# Patient Record
Sex: FEMALE | Race: WHITE | NOT HISPANIC OR LATINO | Employment: FULL TIME | URBAN - METROPOLITAN AREA
[De-identification: names, ages, dates, MRNs, and addresses within clinical notes are randomized per-mention and may not be internally consistent; named-entity substitution may affect disease eponyms.]

---

## 2021-08-17 ENCOUNTER — TRANSFERRED RECORDS (OUTPATIENT)
Dept: HEALTH INFORMATION MANAGEMENT | Facility: CLINIC | Age: 36
End: 2021-08-17

## 2021-08-18 ENCOUNTER — TRANSFERRED RECORDS (OUTPATIENT)
Dept: HEALTH INFORMATION MANAGEMENT | Facility: CLINIC | Age: 36
End: 2021-08-18

## 2021-08-26 ENCOUNTER — TRANSFERRED RECORDS (OUTPATIENT)
Dept: HEALTH INFORMATION MANAGEMENT | Facility: CLINIC | Age: 36
End: 2021-08-26

## 2021-09-02 ENCOUNTER — TRANSFERRED RECORDS (OUTPATIENT)
Dept: HEALTH INFORMATION MANAGEMENT | Facility: CLINIC | Age: 36
End: 2021-09-02

## 2021-09-03 ENCOUNTER — TELEPHONE (OUTPATIENT)
Dept: OBGYN | Facility: CLINIC | Age: 36
End: 2021-09-03

## 2021-09-03 ENCOUNTER — MEDICAL CORRESPONDENCE (OUTPATIENT)
Dept: HEALTH INFORMATION MANAGEMENT | Facility: CLINIC | Age: 36
End: 2021-09-03

## 2021-09-03 ENCOUNTER — PREP FOR PROCEDURE (OUTPATIENT)
Dept: OBGYN | Facility: CLINIC | Age: 36
End: 2021-09-03

## 2021-09-03 DIAGNOSIS — Z20.822 COVID-19 RULED OUT: ICD-10-CM

## 2021-09-03 DIAGNOSIS — Z33.2 TERMINATION OF PREGNANCY (FETUS): Primary | ICD-10-CM

## 2021-09-03 DIAGNOSIS — O35.13X1 FETAL TRISOMY 21 AFFECTING CARE OF MOTHER, ANTEPARTUM, FETUS 1: Primary | ICD-10-CM

## 2021-09-03 RX ORDER — MISOPROSTOL 200 UG/1
TABLET ORAL
Qty: 2 TABLET | Refills: 0 | Status: ON HOLD | OUTPATIENT
Start: 2021-09-03 | End: 2021-09-08

## 2021-09-03 NOTE — PROGRESS NOTES
Kimberly Minor is a 36 year old  at 13w5d today    VALE: 3/6/22  Fetal Diagnosis: trisomy 21 (+NIPT, CVS)  Ob History: 2 (presumed) , 3 early pregnancy losses  Referral provider: Irma Talyor MD ([facility])    Surgery planning:  -- WRTK: Kiel Frankel MD ([facility]) on 9/3/21  -- Miso: 400 mcg buccally 2-3 hours prior to procedure  -- Osmotic dilators: no  -- Cremation: Unknown  -- Footprints: Unknown  -- Cytogenetics: Unknown    Pre-op orders placed.  MD Samantha

## 2021-09-03 NOTE — TELEPHONE ENCOUNTER
Day of Surgery:    Your surgery is scheduled at [facility].  [facility].  [address] [place] 3rd floor.  If parking is needed please park in the [place].  If you are unsure how to get to the hospital - search google maps for [facility] [address].    Just stop at the  and security will tell you where you need to go for your surgery.    Arrive at the hospital 2 hours before your surgery at 8am.  Your surgery is scheduled at 10am.  Please do not eat or drink after midnight.  You may have sips of clear liquids (water, black coffee, Gatorade) up to 8am.  If you have been prescribed medication to take before surgery or if you have prescription medications that you take everyday, you can take these with a sip of water.    A responsible adult will need to take you home after surgery.     Someone will be calling you to schedule a Covid PCR test.

## 2021-09-03 NOTE — TELEPHONE ENCOUNTER
This writer called pt and discussed pre op information for the Scheduled D&E.  Cytotec ordered to pt preferred pharmacy.

## 2021-09-03 NOTE — TELEPHONE ENCOUNTER
Referral received from [facility] for D&E.  She is being referred to [facility] because Trisomy 21  Gestational age 13+5  VALE 03/06/22  Medical records have/have not been received    An email has been sent to Kamaljit/financial counselor to verify insurance  WTRK consent completed:09/3/21  If MA, the Medical Necessity form is complete and has been priority scanned to the patient's chart.

## 2021-09-07 ENCOUNTER — ANESTHESIA EVENT (OUTPATIENT)
Dept: SURGERY | Facility: CLINIC | Age: 36
End: 2021-09-07
Payer: COMMERCIAL

## 2021-09-07 ENCOUNTER — TELEPHONE (OUTPATIENT)
Dept: OBGYN | Facility: CLINIC | Age: 36
End: 2021-09-07

## 2021-09-07 ENCOUNTER — LAB (OUTPATIENT)
Dept: LAB | Facility: CLINIC | Age: 36
End: 2021-09-07
Attending: OBSTETRICS & GYNECOLOGY
Payer: COMMERCIAL

## 2021-09-07 DIAGNOSIS — Z20.822 COVID-19 RULED OUT: ICD-10-CM

## 2021-09-07 PROBLEM — O35.13X1: Status: ACTIVE | Noted: 2021-09-07

## 2021-09-07 LAB — SARS-COV-2 RNA RESP QL NAA+PROBE: NEGATIVE

## 2021-09-07 PROCEDURE — U0003 INFECTIOUS AGENT DETECTION BY NUCLEIC ACID (DNA OR RNA); SEVERE ACUTE RESPIRATORY SYNDROME CORONAVIRUS 2 (SARS-COV-2) (CORONAVIRUS DISEASE [COVID-19]), AMPLIFIED PROBE TECHNIQUE, MAKING USE OF HIGH THROUGHPUT TECHNOLOGIES AS DESCRIBED BY CMS-2020-01-R: HCPCS

## 2021-09-07 PROCEDURE — U0005 INFEC AGEN DETEC AMPLI PROBE: HCPCS

## 2021-09-07 NOTE — ANESTHESIA PREPROCEDURE EVALUATION
Anesthesia Pre-Procedure Evaluation    Patient: Kimberly Minor   MRN: [id number] : 1985        Preoperative Diagnosis: Fetal trisomy 21 affecting care of mother, antepartum, fetus 1 [O35.1XX1]   Procedure : Procedure(s):  DILATION AND EVACUATION, UTERUS     No past medical history on file.   No past surgical history on file.   Not on File   Social History     Tobacco Use     Smoking status: Not on file   Substance Use Topics     Alcohol use: Not on file      Wt Readings from Last 1 Encounters:   No data found for Wt        Anesthesia Evaluation   Pt has had prior anesthetic. Type: General and MAC.        ROS/MED HX  ENT/Pulmonary:  - neg pulmonary ROS     Neurologic:  - neg neurologic ROS     Cardiovascular:     (+) -----Previous cardiac testing   Echo: Date:  Results:  Wnl  Stress Test: Date: Results:    ECG Reviewed: Date:  Results:  Wnl  Cath: Date: Results:      METS/Exercise Tolerance: >4 METS    Hematologic:  - neg hematologic  ROS     Musculoskeletal:  - neg musculoskeletal ROS     GI/Hepatic:  - neg GI/hepatic ROS     Renal/Genitourinary:  - neg Renal ROS     Endo:  - neg endo ROS     Psychiatric/Substance Use:     (+) psychiatric history anxiety     Infectious Disease:  - neg infectious disease ROS     Malignancy:  - neg malignancy ROS     Other: Comment:   at 13w5d today           Physical Exam    Airway  airway exam normal      Mallampati: I   TM distance: > 3 FB   Neck ROM: full   Mouth opening: > 3 cm    Respiratory Devices and Support         Dental  no notable dental history         Cardiovascular   cardiovascular exam normal          Pulmonary   pulmonary exam normal                OUTSIDE LABS:  CBC:   Lab Results   Component Value Date    WBC 6.3 2011    HGB 13.6 2011    HCT 40.2 2011     2011     BMP:   Lab Results   Component Value Date     2011    POTASSIUM 3.7 2011    CHLORIDE 100 2011    CO2 29 2011    BUN  14 08/01/2011    CR 0.87 08/01/2011    GLC 88 08/01/2011     COAGS: No results found for: PTT, INR, FIBR  POC:   Lab Results   Component Value Date    HCG Negative 08/01/2011     HEPATIC:   Lab Results   Component Value Date    ALBUMIN 4.7 08/01/2011    PROTTOTAL 8.4 08/01/2011    ALT 11 08/01/2011    AST 25 08/01/2011    ALKPHOS 60 08/01/2011    BILITOTAL 0.3 08/01/2011     OTHER:   Lab Results   Component Value Date    PRECIOUS 9.7 08/01/2011    TSH 1.27 08/01/2011       Anesthesia Plan    ASA Status:  2      Anesthesia Type: General.     - Airway: LMA   Induction: Intravenous.   Maintenance: TIVA.        Consents    Anesthesia Plan(s) and associated risks, benefits, and realistic alternatives discussed. Questions answered and patient/representative(s) expressed understanding.     - Discussed with:  Patient         Postoperative Care    Pain management: IV analgesics, Multi-modal analgesia.   PONV prophylaxis: Ondansetron (or other 5HT-3), Background Propofol Infusion, Dexamethasone or Solumedrol     Comments:    Pt wants to be asleep for the procedure. She is NPO appropriate, denies GERD, n/v.             Maryam Rocha MD

## 2021-09-07 NOTE — TELEPHONE ENCOUNTER
Confirmed surgery date, time and location, 9/8/21, arrival time at 8:00a.m with nothing to eat eight hours before scheduled surgery time, clear liquids up to two hours before, h&p will be done day of procedure, COVID testing 9/7/21.     to complete the following fields:            CHECKLIST     Google Calendar : Yes     Resident notified: Not Applicable     Clinic schedule blocked:  Not Applicable    Patient notified:Yes      Pre op information sent: Yes     Given to patient over the phone.Yes    Comments:

## 2021-09-08 ENCOUNTER — HOSPITAL ENCOUNTER (OUTPATIENT)
Facility: CLINIC | Age: 36
Discharge: HOME | End: 2021-09-08
Attending: OBSTETRICS & GYNECOLOGY | Admitting: OBSTETRICS & GYNECOLOGY
Payer: COMMERCIAL

## 2021-09-08 ENCOUNTER — ANESTHESIA (OUTPATIENT)
Dept: SURGERY | Facility: CLINIC | Age: 36
End: 2021-09-08
Payer: COMMERCIAL

## 2021-09-08 VITALS
HEIGHT: 66 IN | OXYGEN SATURATION: 100 % | RESPIRATION RATE: 18 BRPM | SYSTOLIC BLOOD PRESSURE: 105 MMHG | BODY MASS INDEX: 21.79 KG/M2 | HEART RATE: 67 BPM | TEMPERATURE: 98 F | DIASTOLIC BLOOD PRESSURE: 69 MMHG | WEIGHT: 135.58 LBS

## 2021-09-08 DIAGNOSIS — Z98.890 S/P DILATION AND CURETTAGE: Primary | ICD-10-CM

## 2021-09-08 DIAGNOSIS — O35.13X1 FETAL TRISOMY 21 AFFECTING CARE OF MOTHER, ANTEPARTUM, FETUS 1: ICD-10-CM

## 2021-09-08 LAB
ABO/RH(D): NORMAL
ANTIBODY SCREEN: NEGATIVE
GLUCOSE BLDC GLUCOMTR-MCNC: 77 MG/DL (ref 70–99)
HGB BLD-MCNC: 12.6 G/DL (ref 11.7–15.7)
HOLD SPECIMEN: NORMAL
SPECIMEN EXPIRATION DATE: NORMAL

## 2021-09-08 PROCEDURE — 88305 TISSUE EXAM BY PATHOLOGIST: CPT | Mod: 26 | Performed by: PATHOLOGY

## 2021-09-08 PROCEDURE — 710N000010 HC RECOVERY PHASE 1, LEVEL 2, PER MIN: Performed by: OBSTETRICS & GYNECOLOGY

## 2021-09-08 PROCEDURE — 250N000009 HC RX 250: Performed by: STUDENT IN AN ORGANIZED HEALTH CARE EDUCATION/TRAINING PROGRAM

## 2021-09-08 PROCEDURE — 86900 BLOOD TYPING SEROLOGIC ABO: CPT

## 2021-09-08 PROCEDURE — 250N000011 HC RX IP 250 OP 636

## 2021-09-08 PROCEDURE — 250N000009 HC RX 250: Performed by: OBSTETRICS & GYNECOLOGY

## 2021-09-08 PROCEDURE — 250N000013 HC RX MED GY IP 250 OP 250 PS 637: Performed by: STUDENT IN AN ORGANIZED HEALTH CARE EDUCATION/TRAINING PROGRAM

## 2021-09-08 PROCEDURE — 88305 TISSUE EXAM BY PATHOLOGIST: CPT | Mod: TC | Performed by: OBSTETRICS & GYNECOLOGY

## 2021-09-08 PROCEDURE — 360N000075 HC SURGERY LEVEL 2, PER MIN: Performed by: OBSTETRICS & GYNECOLOGY

## 2021-09-08 PROCEDURE — 999N000141 HC STATISTIC PRE-PROCEDURE NURSING ASSESSMENT: Performed by: OBSTETRICS & GYNECOLOGY

## 2021-09-08 PROCEDURE — 272N000001 HC OR GENERAL SUPPLY STERILE: Performed by: OBSTETRICS & GYNECOLOGY

## 2021-09-08 PROCEDURE — 76998 US GUIDE INTRAOP: CPT | Mod: 26 | Performed by: OBSTETRICS & GYNECOLOGY

## 2021-09-08 PROCEDURE — 59841 INDUCED ABORTION DILAT&EVAC: CPT | Mod: GC | Performed by: OBSTETRICS & GYNECOLOGY

## 2021-09-08 PROCEDURE — 85018 HEMOGLOBIN: CPT

## 2021-09-08 PROCEDURE — 258N000003 HC RX IP 258 OP 636: Performed by: STUDENT IN AN ORGANIZED HEALTH CARE EDUCATION/TRAINING PROGRAM

## 2021-09-08 PROCEDURE — 710N000012 HC RECOVERY PHASE 2, PER MINUTE: Performed by: OBSTETRICS & GYNECOLOGY

## 2021-09-08 PROCEDURE — 258N000003 HC RX IP 258 OP 636: Performed by: OBSTETRICS & GYNECOLOGY

## 2021-09-08 PROCEDURE — 250N000011 HC RX IP 250 OP 636: Performed by: STUDENT IN AN ORGANIZED HEALTH CARE EDUCATION/TRAINING PROGRAM

## 2021-09-08 PROCEDURE — 370N000017 HC ANESTHESIA TECHNICAL FEE, PER MIN: Performed by: OBSTETRICS & GYNECOLOGY

## 2021-09-08 RX ORDER — KETOROLAC TROMETHAMINE 30 MG/ML
INJECTION, SOLUTION INTRAMUSCULAR; INTRAVENOUS PRN
Status: DISCONTINUED | OUTPATIENT
Start: 2021-09-08 | End: 2021-09-08

## 2021-09-08 RX ORDER — DEXAMETHASONE SODIUM PHOSPHATE 4 MG/ML
INJECTION, SOLUTION INTRA-ARTICULAR; INTRALESIONAL; INTRAMUSCULAR; INTRAVENOUS; SOFT TISSUE PRN
Status: DISCONTINUED | OUTPATIENT
Start: 2021-09-08 | End: 2021-09-08

## 2021-09-08 RX ORDER — SODIUM CHLORIDE, SODIUM LACTATE, POTASSIUM CHLORIDE, CALCIUM CHLORIDE 600; 310; 30; 20 MG/100ML; MG/100ML; MG/100ML; MG/100ML
INJECTION, SOLUTION INTRAVENOUS CONTINUOUS PRN
Status: DISCONTINUED | OUTPATIENT
Start: 2021-09-08 | End: 2021-09-08

## 2021-09-08 RX ORDER — ONDANSETRON 2 MG/ML
INJECTION INTRAMUSCULAR; INTRAVENOUS PRN
Status: DISCONTINUED | OUTPATIENT
Start: 2021-09-08 | End: 2021-09-08

## 2021-09-08 RX ORDER — LIDOCAINE HYDROCHLORIDE 10 MG/ML
INJECTION, SOLUTION INFILTRATION; PERINEURAL PRN
Status: DISCONTINUED | OUTPATIENT
Start: 2021-09-08 | End: 2021-09-08 | Stop reason: HOSPADM

## 2021-09-08 RX ORDER — ACETAMINOPHEN 325 MG/1
975 TABLET ORAL ONCE
Status: COMPLETED | OUTPATIENT
Start: 2021-09-08 | End: 2021-09-08

## 2021-09-08 RX ORDER — PROPOFOL 10 MG/ML
INJECTION, EMULSION INTRAVENOUS CONTINUOUS PRN
Status: DISCONTINUED | OUTPATIENT
Start: 2021-09-08 | End: 2021-09-08

## 2021-09-08 RX ORDER — ONDANSETRON 2 MG/ML
4 INJECTION INTRAMUSCULAR; INTRAVENOUS EVERY 30 MIN PRN
Status: DISCONTINUED | OUTPATIENT
Start: 2021-09-08 | End: 2021-09-08 | Stop reason: HOSPADM

## 2021-09-08 RX ORDER — MEPERIDINE HYDROCHLORIDE 25 MG/ML
12.5 INJECTION INTRAMUSCULAR; INTRAVENOUS; SUBCUTANEOUS
Status: DISCONTINUED | OUTPATIENT
Start: 2021-09-08 | End: 2021-09-08 | Stop reason: HOSPADM

## 2021-09-08 RX ORDER — SODIUM CHLORIDE, SODIUM LACTATE, POTASSIUM CHLORIDE, CALCIUM CHLORIDE 600; 310; 30; 20 MG/100ML; MG/100ML; MG/100ML; MG/100ML
INJECTION, SOLUTION INTRAVENOUS CONTINUOUS
Status: DISCONTINUED | OUTPATIENT
Start: 2021-09-08 | End: 2021-09-08 | Stop reason: HOSPADM

## 2021-09-08 RX ORDER — LIDOCAINE HYDROCHLORIDE 20 MG/ML
INJECTION, SOLUTION INFILTRATION; PERINEURAL PRN
Status: DISCONTINUED | OUTPATIENT
Start: 2021-09-08 | End: 2021-09-08

## 2021-09-08 RX ORDER — FENTANYL CITRATE 50 UG/ML
INJECTION, SOLUTION INTRAMUSCULAR; INTRAVENOUS PRN
Status: DISCONTINUED | OUTPATIENT
Start: 2021-09-08 | End: 2021-09-08

## 2021-09-08 RX ORDER — PROPOFOL 10 MG/ML
INJECTION, EMULSION INTRAVENOUS PRN
Status: DISCONTINUED | OUTPATIENT
Start: 2021-09-08 | End: 2021-09-08

## 2021-09-08 RX ORDER — OXYCODONE HYDROCHLORIDE 5 MG/1
5 TABLET ORAL EVERY 4 HOURS PRN
Status: DISCONTINUED | OUTPATIENT
Start: 2021-09-08 | End: 2021-09-08 | Stop reason: HOSPADM

## 2021-09-08 RX ORDER — ONDANSETRON 4 MG/1
4 TABLET, ORALLY DISINTEGRATING ORAL EVERY 30 MIN PRN
Status: DISCONTINUED | OUTPATIENT
Start: 2021-09-08 | End: 2021-09-08 | Stop reason: HOSPADM

## 2021-09-08 RX ORDER — OMEGA-3 FATTY ACIDS/FISH OIL 300-1000MG
600 CAPSULE ORAL EVERY 6 HOURS PRN
COMMUNITY
Start: 2021-09-08

## 2021-09-08 RX ADMIN — PROPOFOL 200 MCG/KG/MIN: 10 INJECTION, EMULSION INTRAVENOUS at 10:25

## 2021-09-08 RX ADMIN — DEXAMETHASONE SODIUM PHOSPHATE 4 MG: 4 INJECTION, SOLUTION INTRAMUSCULAR; INTRAVENOUS at 10:36

## 2021-09-08 RX ADMIN — DOXYCYCLINE 200 MG: 100 INJECTION, POWDER, LYOPHILIZED, FOR SOLUTION INTRAVENOUS at 10:33

## 2021-09-08 RX ADMIN — PROPOFOL 50 MG: 10 INJECTION, EMULSION INTRAVENOUS at 10:27

## 2021-09-08 RX ADMIN — MIDAZOLAM 2 MG: 1 INJECTION INTRAMUSCULAR; INTRAVENOUS at 10:18

## 2021-09-08 RX ADMIN — PROPOFOL 100 MG: 10 INJECTION, EMULSION INTRAVENOUS at 10:25

## 2021-09-08 RX ADMIN — KETOROLAC TROMETHAMINE 30 MG: 30 INJECTION, SOLUTION INTRAMUSCULAR at 10:54

## 2021-09-08 RX ADMIN — FENTANYL CITRATE 50 MCG: 50 INJECTION, SOLUTION INTRAMUSCULAR; INTRAVENOUS at 10:25

## 2021-09-08 RX ADMIN — MIDAZOLAM 2 MG: 1 INJECTION INTRAMUSCULAR; INTRAVENOUS at 10:15

## 2021-09-08 RX ADMIN — PROPOFOL 30 MG: 10 INJECTION, EMULSION INTRAVENOUS at 10:28

## 2021-09-08 RX ADMIN — ACETAMINOPHEN 975 MG: 325 TABLET, FILM COATED ORAL at 11:34

## 2021-09-08 RX ADMIN — LIDOCAINE HYDROCHLORIDE 100 MG: 20 INJECTION, SOLUTION INFILTRATION; PERINEURAL at 10:25

## 2021-09-08 RX ADMIN — SODIUM CHLORIDE, POTASSIUM CHLORIDE, SODIUM LACTATE AND CALCIUM CHLORIDE: 600; 310; 30; 20 INJECTION, SOLUTION INTRAVENOUS at 10:15

## 2021-09-08 RX ADMIN — ONDANSETRON 4 MG: 2 INJECTION INTRAMUSCULAR; INTRAVENOUS at 10:54

## 2021-09-08 ASSESSMENT — MIFFLIN-ST. JEOR: SCORE: 1311.75

## 2021-09-08 NOTE — OP NOTE
[facility] Gynecology Operative Note    Date of Surgery: 2021  Patient: Kimberly Minor  MRN: [id number]    Preoperative diagnosis: Intrauterine pregnancy at 14w3d, Fetal trisomy 21  Post-operative diagnosis: Same as above, s/p procedure below  Procedure: Exam under anesthesia, Dilation and Evacuation under US guidance  Surgeon: Kimberly Johnson MD  Assistant: Edelmira Villalobos MD, PGY4   Roma Bruno DO, MS, PGY-1    Anesthesia: General, local  EBL: 50 ml     Findings: Normal external genitalia, 14 week size uterus. Cerix closed at start of the case. Products of conception evacuated under US guidance. Homogenous, thin endometrial stripe at the end of the case. Hemostatic surgical site at end of case.    Indications: Ms. Kimberly Minor is a 36 year old  who presented for dilation and evacuation at 14w3d due to fetal trisomy 21. The risks, benefits and alternatives to the procedure were discussed and patient signed written consent. WRTK completed 9/3/21.    Procedure: The patient was taken to the OR where general anesthesia was administered without difficulty. She was placed in the dorsal lithotomy position with yellowfin stirrups. Exam under anesthesia reveal the findings above. The patient was prepped and draped in usual sterile fashion.    A speculum was introduced into the vagina and used to visualize the cervix. A single-toothed tenaculum was used to grasp the anterior lip of the cervix. A paracervical block was performed with 20 ml of 1% lidocaine injected at the four and eight o'clock positions of the cervico-vaginal junction. Under US guidance, Johnson dilators were then used to dilate the cervix to 43 Frisian.  A 14 mm rigid suction curette was placed easily.  Suction was set to 60-80 mmHg, and was used to evacuate the uterus of the products of conception, grossly visualized through the tubing. The tenaculum was then removed, and the tenaculum site was noted to be hemostatic.  The sterile speculum was removed.    The products of conception were inspected grossly, and noted to be complete.  The sponge, needle, and instrument counts were correct.  The patient tolerated the procedure well and was transferred to recovery in stable condition.      Dr. Johnson was present for the entirety of the procedure.    Kimberly is Rh POS and does not require rhogam. She desired hospital disposition of remains.    Edelmira Villalobos MD  Obstetrics and Gynecology, PGY-4  9/8/2021 10:58 AM    OBGYN Attending Addendum     I, Kimberly Johnson, was scrubbed and present for the entire procedure. I have reviewed Dr. Villalobos's operative report and edited where necessary. I agree with the documentation of findings.     Kimberly Johnson MD, MSCI  Date of Service: 09/08/21

## 2021-09-08 NOTE — ANESTHESIA CARE TRANSFER NOTE
Patient: Kimberly MATHIS Foster    Procedure(s):  DILATION AND EVACUATION, UTERUS    Diagnosis: Fetal trisomy 21 affecting care of mother, antepartum, fetus 1 [O35.1XX1]  Diagnosis Additional Information: No value filed.    Anesthesia Type:   General     Note:    Oropharynx: oropharynx clear of all foreign objects and spontaneously breathing  Level of Consciousness: awake and drowsy  Oxygen Supplementation: face mask  Level of Supplemental Oxygen (L/min / FiO2): 8  Independent Airway: airway patency satisfactory and stable  Dentition: dentition unchanged  Vital Signs Stable: post-procedure vital signs reviewed and stable  Report to RN Given: handoff report given  Patient transferred to: PACU    Handoff Report: Identifed the Patient, Identified the Reponsible Provider, Reviewed the pertinent medical history, Discussed the surgical course, Reviewed Intra-OP anesthesia mangement and issues during anesthesia, Set expectations for post-procedure period and Allowed opportunity for questions and acknowledgement of understanding      Vitals:  Vitals Value Taken Time   BP     Temp     Pulse     Resp     SpO2 100 % 09/08/21 1112   Vitals shown include unvalidated device data.    Electronically Signed By: MERRILL Lo CRNA  September 8, 2021  11:13 AM

## 2021-09-08 NOTE — H&P
[facility] History and Physical    Kimberly Minor MRN# [id number]   Age: 36 year old YOB: 1985     Date of Admission:  2021    Consulting provider:    HPI: Kimberly Minor is a 36 year old  at 14w3d who presents for dilation and evacuation procedure. The patient had a positive cell free DNA screen for Trisomy 21 and CVS testing confirmed the diagnosis. She wishes to terminate the pregnancy. The patient is feeling appropriately sad emotionally, however physically feels well.     ROS: 10 point ROS negative unless otherwise specified in HPI.     PMH: No significant medical history  No past medical history on file.    PSHx: D&C, wisdom teeth removal  History reviewed. No pertinent surgical history.    Medications: No prior to admission medications  Current Facility-Administered Medications   Medication     Provider ordered ALTERNATE pre op antibiotic.     No current facility-administered medications on file prior to encounter.  misoprostol (CYTOTEC) 200 MCG tablet, Take 2 hours before procedure. Place 1 tab between cheek and gum on the right, and 2nd tab on the left. Dissolve for 30 min, then swallow.      Allergies: Sensitivity to hydrocodone   No Known Allergies    Social History:   Social History     Socioeconomic History     Marital status:      Spouse name: Not on file     Number of children: Not on file     Years of education: Not on file     Highest education level: Not on file   Occupational History     Not on file   Tobacco Use     Smoking status: Not on file   Substance and Sexual Activity     Alcohol use: Not on file     Drug use: Not on file     Sexual activity: Not on file   Other Topics Concern     Not on file   Social History Narrative     Not on file     Social Determinants of Health     Financial Resource Strain:      Difficulty of Paying Living Expenses:    Food Insecurity:      Worried About Running Out of Food in the Last Year:      Ran Out of Food in the  "Last Year:    Transportation Needs:      Lack of Transportation (Medical):      Lack of Transportation (Non-Medical):    Physical Activity:      Days of Exercise per Week:      Minutes of Exercise per Session:    Stress:      Feeling of Stress :    Social Connections:      Frequency of Communication with Friends and Family:      Frequency of Social Gatherings with Friends and Family:      Attends Muslim Services:      Active Member of Clubs or Organizations:      Attends Club or Organization Meetings:      Marital Status:    Intimate Partner Violence:      Fear of Current or Ex-Partner:      Emotionally Abused:      Physically Abused:      Sexually Abused:      Physical Exam:   Vitals:    21 0835   BP: 109/74   Pulse: 74   Resp: 16   Temp: 98.6  F (37  C)   TempSrc: Oral   SpO2: 100%   Weight: 61.5 kg (135 lb 9.3 oz)   Height: 1.676 m (5' 6\")      Gen: Well appearing, tearful  HEENT: Normocephalic,atraumatic  CV: RRR, no murmurs/rubs/gallops  Pulm: CTAB, no increased work of breathing, no wheezing/rhonchi/crackles  Abd: non-tender, non-distended  Extremities: well perfused    Labs:   Results for orders placed or performed during the hospital encounter of 21   Glucose by meter     Status: Normal   Result Value Ref Range    GLUCOSE BY METER POCT 77 70 - 99 mg/dL   ABO/Rh type and screen     Status: None ()    Narrative    The following orders were created for panel order ABO/Rh type and screen.  Procedure                               Abnormality         Status                     ---------                               -----------         ------                     Adult Type and Screen[[id number]]                                                         Please view results for these tests on the individual orders.       A&P: Kimberly Minor is a 36 year old  at 14w3d who presents for D&E secondary to diagnosis of fetal Trisomy 21. All medical history reviewed and correct as stated above. " Preoperative labs pending. Per chart review, patient is Rh positive and therefore not a candidate for Rhogam. IV doxycycline ordered for preoperative antibiotics.     Will proceed to OR for scheduled procedure. Patient seen with Dr. Johnson.     Edelmira Villalobos, PGY-4  OBGYN Resident  9/8/2021 9:18 AM

## 2021-09-08 NOTE — ANESTHESIA POSTPROCEDURE EVALUATION
Patient: Kimberly MATHIS Foster    Procedure(s):  DILATION AND EVACUATION, UTERUS    Diagnosis:Fetal trisomy 21 affecting care of mother, antepartum, fetus 1 [O35.1XX1]  Diagnosis Additional Information: No value filed.    Anesthesia Type:  General    Note:  Disposition: Outpatient   Postop Pain Control: Uneventful            Sign Out: Well controlled pain   PONV: No   Neuro/Psych: Uneventful            Sign Out: Acceptable/Baseline neuro status   Airway/Respiratory: Uneventful            Sign Out: Acceptable/Baseline resp. status   CV/Hemodynamics: Uneventful            Sign Out: Acceptable CV status; No obvious hypovolemia; No obvious fluid overload   Other NRE: NONE   DID A NON-ROUTINE EVENT OCCUR? No           Last vitals:  Vitals Value Taken Time   /61 09/08/21 1115   Temp 36.8  C (98.2  F) 09/08/21 1111   Pulse 69 09/08/21 1123   Resp 21 09/08/21 1123   SpO2 100 % 09/08/21 1123   Vitals shown include unvalidated device data.    Electronically Signed By: Maryam Rocha MD  September 8, 2021  11:23 AM

## 2021-09-08 NOTE — DISCHARGE INSTRUCTIONS
Discharge Instructions: Following a Dilation   and Curettage/Dilation and Evacuation    What to expect:    Expect small to moderate amount of vaginal bleeding which should taper off in 4-5 days. It should not be heavier than your regular menstrual flow.    Do not douche, and use a pad rather than tampons.     No intercourse until bleeding has ceased.    Activity:    Rest the day of surgery. You may resume normal activity the next day.    You may bathe or shower.    Avoid heavy lifting (10-15 lbs) for one week.    Comfort:    The amount of discomfort you can expect is very unpredictable. If you have pain that cannot be controlled with non-aspirin pain relievers or with the prescription you may have received, you should notify your doctor.    Abdominal cramping (like menstrual cramps) or low back ache are common and should not be a cause for concern. You will be drowsy and weak the day of surgery and possibly the following day.    Diet:    You have no restrictions on your diet. Following surgery, drink plenty of fluids and eat a light meal.    Nausea:    The anesthesia medications you received during your surgical procedure may produce some nausea.    If you feel nauseated, stay in bed, keep your head down and try drinking fluids such as Seven-Up, tea or soup.    Notify Physician at once if you experience:    A fever over 100.4 degrees (a low grade fever under 100 degrees is usual after surgery).    Heavy flow and/or passing large clots. Saturating more than 1 pad per hour for 2 or more hours.     Severe pain or cramps.    Important numbers  [facility] ([address]) - [place]: [phone]   [facility] ([address]) : [phone]  Rev. 5/12        Same-Day Surgery   Adult Discharge Orders & Instructions     For 24 hours after surgery:  1. Get plenty of rest.  A responsible adult must stay with you for at least 24 hours after you leave the hospital.   2. Pain medication  can slow your reflexes. Do not drive or use heavy equipment.  If you have weakness or tingling, don't drive or use heavy equipment until this feeling goes away.  3. Mixing alcohol and pain medication can cause dizziness and slow your breathing. It can even be fatal. Do not drink alcohol while taking pain medication.  4. Avoid strenuous or risky activities.  Ask for help when climbing stairs.   5. You may feel lightheaded.  If so, sit for a few minutes before standing.  Have someone help you get up.   6. If you have nausea (feel sick to your stomach), drink only clear liquids such as apple juice, ginger ale, broth or 7-Up.  Rest may also help.  Be sure to drink enough fluids.  Move to a regular diet as you feel able. Take pain medications with a small amount of solid food, such as toast or crackers, to avoid nausea.   7. A slight fever is normal. Call the doctor if your fever is over 100 F (37.7 C) (taken under the tongue) or lasts longer than 24 hours.  8. You may have a dry mouth, muscle aches, trouble sleeping or a sore throat.  These symptoms should go away after 24 hours.  9. Do not make important or legal decisions.   Pain Management:      1. Take pain medication (if prescribed) for pain as directed by your physician.        2. WARNING: If the pain medication you have been prescribed contains Tylenol  (acetaminophen), DO NOT take additional doses of Tylenol (acetaminophen).     Call your doctor for any of the followin.  Signs of infection (fever, growing tenderness at the surgery site, severe pain, a large amount of drainage or bleeding, foul-smelling drainage, redness, swelling).    2.  It has been over 8 to 10 hours since surgery and you are still not able to urinate (pee).    3.  Headache for over 24 hours.    4.  Numbness, tingling or weakness the day after surgery (if you had spinal anesthesia).  To contact a doctor, call _____________________________________ or:      [phone] and ask for the  Resident On Call for:          _____OBGYN____________________ (answered 24 hours a day)      Emergency Department:  [facility]: [phone]  [place]: [phone]               Rev. 10/2014

## 2021-09-19 LAB
PATH REPORT.COMMENTS IMP SPEC: NORMAL
PATH REPORT.COMMENTS IMP SPEC: NORMAL
PATH REPORT.FINAL DX SPEC: NORMAL
PATH REPORT.GROSS SPEC: NORMAL
PATH REPORT.MICROSCOPIC SPEC OTHER STN: NORMAL
PATH REPORT.RELEVANT HX SPEC: NORMAL
PHOTO IMAGE: NORMAL

## 2021-09-29 ENCOUNTER — VIRTUAL VISIT (OUTPATIENT)
Dept: OBGYN | Facility: CLINIC | Age: 36
End: 2021-09-29
Payer: COMMERCIAL

## 2021-09-29 ENCOUNTER — TELEPHONE (OUTPATIENT)
Dept: OBGYN | Facility: CLINIC | Age: 36
End: 2021-09-29
Payer: COMMERCIAL

## 2021-09-29 DIAGNOSIS — Z91.199 FAILURE TO ATTEND APPOINTMENT: Primary | ICD-10-CM

## 2021-09-29 PROCEDURE — 99207 PR NO BILLABLE SERVICE THIS VISIT: CPT | Performed by: OBSTETRICS & GYNECOLOGY

## 2021-09-29 NOTE — PROGRESS NOTES
[facility]  Postoperative Visit  2021    S: Kimberly Minor is a 36 year old  here for post-operative visit following D&E on 21. Procedure was ***complicated***.      Patient reports feeling well***. Pain ***. *** vaginal bleeding***. Denies fevers, chills, SOB, chest pain, nausea, emesis, constipation, purulent vaginal discharge***.     O:   There were no vitals taken for this visit.    Gen: NAD, well appearing***  CV: RR***  Resp: Non-labored breathing on room air***  Abd: soft, non-distended, non-tender***  Inc: C/D/I***, no surrounding erythema or induration***, no drainage, *** heal***  Ext: no edema***  Pelvic: ***    Labs: ***  Hemoglobin   Date Value Ref Range Status   2021 12.6 11.7 - 15.7 g/dL Final   2011 13.6 11.7 - 15.7 g/dL Final       Pathology:   No results found for: PATH    A/P: 36 year old POD#*** s/p ***. Doing well, no concerns***    # post-operative state  - ***    # ***  - ***    Vadim Hebert MD  Ob/Gyn Resident, PGY-2  21 6:54 AM

## 2021-09-30 NOTE — TELEPHONE ENCOUNTER
Called patient for follow up s/p D&E on 9/8/2021. Doing well. No concerns. Recovering well physically and emotionally. Post-op visit already completed with outside CNM.     Vadim Hebert MD  Ob/Gyn Resident, PGY-3  09/29/2021 7:01 PM

## 2025-05-07 ENCOUNTER — OFFICE VISIT (OUTPATIENT)
Dept: PLASTIC SURGERY | Facility: CLINIC | Age: 40
End: 2025-05-07
Payer: COMMERCIAL

## 2025-05-07 DIAGNOSIS — R22.0 HEAD MASS: Primary | ICD-10-CM

## 2025-05-07 NOTE — LETTER
May 7, 2025  Re: Kimberly Minor  1985    Dear Dr. Barrera,    Thank you so much for referring Kimberly Minor to the [facility]. I had the pleasure of visiting with Kimberly today.     Attached you will find a copy of my note. Please feel free to reach out to me with any questions, [phone].     Facial Plastic and Reconstructive Surgery Consultation        HPI:   I had the pleasure of seeing Kimberly Minor today in clinic for consultation for forehead mass. Kimberly Minor is a 39 year old female. She notes a forehead mass for the past couple of years. Feels like it is growing. No trauma. It is non tender.         Review Of Systems  ROS: 10 point ROS neg other than the symptoms noted above in the HPI.    Patient Active Problem List   Diagnosis     Fetal trisomy 21 affecting care of mother, antepartum, fetus 1     Past Surgical History:   Procedure Laterality Date     DILATION AND CURETTAGE SUCTION, TREAT INCOMPLETE        DILATION AND EVACUATION N/A 2021    Procedure: DILATION AND EVACUATION, UTERUS;  Surgeon: Kimberly Johnson MD;  Location: [facility] OR     Current Outpatient Medications   Medication Sig Dispense Refill     ibuprofen (ADVIL/MOTRIN) 200 MG capsule Take 3 capsules (600 mg) by mouth every 6 hours as needed for fever       Patient has no known allergies.  Social History     Socioeconomic History     Marital status:      Spouse name: Not on file     Number of children: Not on file     Years of education: Not on file     Highest education level: Not on file   Occupational History     Not on file   Tobacco Use     Smoking status: Not on file     Smokeless tobacco: Not on file   Substance and Sexual Activity     Alcohol use: Not on file     Drug use: Not on file     Sexual activity: Not on file   Other Topics Concern     Not on file   Social History Narrative     Not on file     Social Drivers of Health     Financial Resource Strain: Not At Risk (3/15/2024)     [facility]    Financial Resource Strain      Is it hard for you to pay for the very basics like food, housing, medical care or heating?: No   Food Insecurity: Not At Risk (3/15/2024)    [facility]    Food Insecurity      Does your food run out before you have the money to buy more?: No   Transportation Needs: Not At Risk (3/15/2024)    [facility]    Transportation Needs      Does a lack of transportation keep you from your medical appointments or from getting your medications?: No   Physical Activity: Not on file   Stress: Not on file   Social Connections: Not on file   Interpersonal Safety: Not on file   Housing Stability: Not on file     No family history on file.    PE:  Alert and Oriented, Answering Questions Appropriately  Atraumatic, Normocephalic, Face Symmetric  Skin: Beverly 2  Facial Nerve Intact and facial movement symmetric  She has a 4mm bony prominence over the midline forehead about 2cm from the hairline. Overlying vein.   EOMI  Nasal Exam: No external Deformity  Chin: Normal   Lips/Teeth/Toungue/Gums: Lips intact  Neck: Trachea midline  Chest: No wheezing, cyanosis, or stridor  Card: not diaphoretic  Neuro/Psych: CN's 2-12 intact, Moves all extremities, ambulation in intact, positive affect, no notable muscle weakness        IMPRESSION/PLAN: Kimberly Minor is a 39 year old female with a forehead mass consistent with an osteoma. We discussed removal and different options. We discussed a pretrichial incision versus incision directly over the mass. Given she does not have forehead lines, she is not wanting a direct incision which is reasonable. Risks and benefits were discussed including but not limited to bleeding, hematoma, infection, scarring, asymmetry, irregularities, numbness/tingling (temporary or permanent), damage to motor nerves (temporary or permanent), need for additional procedures. She is not sure she wants to do anything right  now, but will let us know if it is growing and she wants to have it removed. We would do a pretrichial incision. She is going to call around to see if she can find someone to do it endoscopically as well.       Photodocumentation was obtained.             Your trust in our practice and care is much appreciated.    Sincerely,  Meli Mcfarlane MD

## 2025-05-07 NOTE — LETTER
2025       RE: Kimberly Minor  [address]  [facility] [address]     Dear Colleague,    Thank you for referring your patient, Kimberly Minor, to the [facility] at [facility]. Please see a copy of my visit note below.    Facial Plastic and Reconstructive Surgery Consultation        HPI:   I had the pleasure of seeing Kimberly Minor today in clinic for consultation for forehead mass. Kimberly Minor is a 39 year old female. She notes a forehead mass for the past couple of years. Feels like it is growing. No trauma. It is non tender.         Review Of Systems  ROS: 10 point ROS neg other than the symptoms noted above in the HPI.    Patient Active Problem List   Diagnosis     Fetal trisomy 21 affecting care of mother, antepartum, fetus 1     Past Surgical History:   Procedure Laterality Date     DILATION AND CURETTAGE SUCTION, TREAT INCOMPLETE        DILATION AND EVACUATION N/A 2021    Procedure: DILATION AND EVACUATION, UTERUS;  Surgeon: Kimberly Johnson MD;  [facility]: [facility]     Current Outpatient Medications   Medication Sig Dispense Refill     ibuprofen (ADVIL/MOTRIN) 200 MG capsule Take 3 capsules (600 mg) by mouth every 6 hours as needed for fever       Patient has no known allergies.  Social History     Socioeconomic History     Marital status:      Spouse name: Not on file     Number of children: Not on file     Years of education: Not on file     Highest education level: Not on file   Occupational History     Not on file   Tobacco Use     Smoking status: Not on file     Smokeless tobacco: Not on file   Substance and Sexual Activity     Alcohol use: Not on file     Drug use: Not on file     Sexual activity: Not on file   Other Topics Concern     Not on file   Social History Narrative     Not on file     Social Drivers of Health     Financial Resource Strain: Not At Risk (3/15/2024)    Received from  [facility]    Financial Resource Strain      Is it hard for you to pay for the very basics like food, housing, medical care or heating?: No   Food Insecurity: Not At Risk (3/15/2024)    Received from [facility]    Food Insecurity      Does your food run out before you have the money to buy more?: No   Transportation Needs: Not At Risk (3/15/2024)    Received from [facility]    Transportation Needs      Does a lack of transportation keep you from your medical appointments or from getting your medications?: No   Physical Activity: Not on file   Stress: Not on file   Social Connections: Not on file   Interpersonal Safety: Not on file   Housing Stability: Not on file     No family history on file.    PE:  Alert and Oriented, Answering Questions Appropriately  Atraumatic, Normocephalic, Face Symmetric  Skin: Beverly 2  Facial Nerve Intact and facial movement symmetric  She has a 4mm bony prominence over the midline forehead about 2cm from the hairline. Overlying vein.   EOMI  Nasal Exam: No external Deformity  Chin: Normal   Lips/Teeth/Toungue/Gums: Lips intact  Neck: Trachea midline  Chest: No wheezing, cyanosis, or stridor  Card: not diaphoretic  Neuro/Psych: CN's 2-12 intact, Moves all extremities, ambulation in intact, positive affect, no notable muscle weakness        IMPRESSION/PLAN: Kimberly Minor is a 39 year old female with a forehead mass consistent with an osteoma. We discussed removal and different options. We discussed a pretrichial incision versus incision directly over the mass. Given she does not have forehead lines, she is not wanting a direct incision which is reasonable. Risks and benefits were discussed including but not limited to bleeding, hematoma, infection, scarring, asymmetry, irregularities, numbness/tingling (temporary or permanent), damage to motor nerves (temporary or permanent), need for additional procedures. She is not sure she wants to do anything right now, but will  let us know if it is growing and she wants to have it removed. We would do a pretrichial incision. She is going to call around to see if she can find someone to do it endoscopically as well.       Photodocumentation was obtained.           Again, thank you for allowing me to participate in the care of your patient.      Sincerely,    Meli Mcfarlane MD

## 2025-05-07 NOTE — PROGRESS NOTES
Facial Plastic and Reconstructive Surgery Consultation        HPI:   I had the pleasure of seeing Kimberly Minor today in clinic for consultation for forehead mass. Kimberly Minor is a 39 year old female. She notes a forehead mass for the past couple of years. Feels like it is growing. No trauma. It is non tender.         Review Of Systems  ROS: 10 point ROS neg other than the symptoms noted above in the HPI.    Patient Active Problem List   Diagnosis    Fetal trisomy 21 affecting care of mother, antepartum, fetus 1     Past Surgical History:   Procedure Laterality Date    DILATION AND CURETTAGE SUCTION, TREAT INCOMPLETE       DILATION AND EVACUATION N/A 2021    Procedure: DILATION AND EVACUATION, UTERUS;  Surgeon: Kimberly Johnson MD;  [facility]: [facility]     Current Outpatient Medications   Medication Sig Dispense Refill    ibuprofen (ADVIL/MOTRIN) 200 MG capsule Take 3 capsules (600 mg) by mouth every 6 hours as needed for fever       Patient has no known allergies.  Social History     Socioeconomic History    Marital status:      Spouse name: Not on file    Number of children: Not on file    Years of education: Not on file    Highest education level: Not on file   Occupational History    Not on file   Tobacco Use    Smoking status: Not on file    Smokeless tobacco: Not on file   Substance and Sexual Activity    Alcohol use: Not on file    Drug use: Not on file    Sexual activity: Not on file   Other Topics Concern    Not on file   Social History Narrative    Not on file     Social Drivers of Health     Financial Resource Strain: Not At Risk (3/15/2024)    Received from [facility]    Financial Resource Strain     Is it hard for you to pay for the very basics like food, housing, medical care or heating?: No   Food Insecurity: Not At Risk (3/15/2024)    Received from [facility]    Food Insecurity     Does your food run out before you have the money to buy more?: No   Transportation  Needs: Not At Risk (3/15/2024)    [facility]    Transportation Needs     Does a lack of transportation keep you from your medical appointments or from getting your medications?: No   Physical Activity: Not on file   Stress: Not on file   Social Connections: Not on file   Interpersonal Safety: Not on file   Housing Stability: Not on file     No family history on file.    PE:  Alert and Oriented, Answering Questions Appropriately  Atraumatic, Normocephalic, Face Symmetric  Skin: Beverly 2  Facial Nerve Intact and facial movement symmetric  She has a 4mm bony prominence over the midline forehead about 2cm from the hairline. Overlying vein.   EOMI  Nasal Exam: No external Deformity  Chin: Normal   Lips/Teeth/Toungue/Gums: Lips intact  Neck: Trachea midline  Chest: No wheezing, cyanosis, or stridor  Card: not diaphoretic  Neuro/Psych: CN's 2-12 intact, Moves all extremities, ambulation in intact, positive affect, no notable muscle weakness        IMPRESSION/PLAN: Kimberly Minor is a 39 year old female with a forehead mass consistent with an osteoma. We discussed removal and different options. We discussed a pretrichial incision versus incision directly over the mass. Given she does not have forehead lines, she is not wanting a direct incision which is reasonable. Risks and benefits were discussed including but not limited to bleeding, hematoma, infection, scarring, asymmetry, irregularities, numbness/tingling (temporary or permanent), damage to motor nerves (temporary or permanent), need for additional procedures. She is not sure she wants to do anything right now, but will let us know if it is growing and she wants to have it removed. We would do a pretrichial incision. She is going to call around to see if she can find someone to do it endoscopically as well.       Photodocumentation was obtained.

## 2025-05-08 RX ORDER — SPIRONOLACTONE 50 MG/1
TABLET, FILM COATED ORAL
COMMUNITY
Start: 2024-12-12

## (undated) DEVICE — TUBING VACUUM COLLECTION SET LG 1/2"X6' BKT-506

## (undated) DEVICE — DECANTER TRANSFER DEVICE 2008S

## (undated) DEVICE — SUCTION CANNULA UTERINE 14MM CVD 1/2" BASE 022114

## (undated) DEVICE — SUCTION VACUUM CANISTER STANDARD W/LID&CAPS 003987-901

## (undated) DEVICE — Device

## (undated) DEVICE — SUCTION VACUUM CANISTER LG LID W/SOCK&CAPS BKC-506

## (undated) DEVICE — GOWN XLG DISP 9545

## (undated) DEVICE — SOL NACL 0.9% IRRIG 1000ML BOTTLE 2F7124

## (undated) DEVICE — STRAP KNEE/BODY 31143004

## (undated) DEVICE — LINEN GOWN X4 5410

## (undated) DEVICE — GLOVE PROTEXIS W/NEU-THERA 6.5  2D73TE65

## (undated) DEVICE — PAD CHUX UNDERPAD 30X36" P3036C

## (undated) DEVICE — GLOVE PROTEXIS BLUE W/NEU-THERA 7.0  2D73EB70

## (undated) DEVICE — LINEN TOWEL PACK X5 5464

## (undated) RX ORDER — VASOPRESSIN 20 U/ML
INJECTION PARENTERAL
Status: DISPENSED
Start: 2021-09-08

## (undated) RX ORDER — DEXAMETHASONE SODIUM PHOSPHATE 4 MG/ML
INJECTION, SOLUTION INTRA-ARTICULAR; INTRALESIONAL; INTRAMUSCULAR; INTRAVENOUS; SOFT TISSUE
Status: DISPENSED
Start: 2021-09-08

## (undated) RX ORDER — PROPOFOL 10 MG/ML
INJECTION, EMULSION INTRAVENOUS
Status: DISPENSED
Start: 2021-09-08

## (undated) RX ORDER — METHYLERGONOVINE MALEATE 0.2 MG/ML
INJECTION INTRAVENOUS
Status: DISPENSED
Start: 2021-09-08

## (undated) RX ORDER — FENTANYL CITRATE 50 UG/ML
INJECTION, SOLUTION INTRAMUSCULAR; INTRAVENOUS
Status: DISPENSED
Start: 2021-09-08

## (undated) RX ORDER — LIDOCAINE HYDROCHLORIDE 10 MG/ML
INJECTION, SOLUTION EPIDURAL; INFILTRATION; INTRACAUDAL; PERINEURAL
Status: DISPENSED
Start: 2021-09-08

## (undated) RX ORDER — ACETAMINOPHEN 325 MG/1
TABLET ORAL
Status: DISPENSED
Start: 2021-09-08